# Patient Record
Sex: FEMALE | Race: WHITE | Employment: FULL TIME | URBAN - METROPOLITAN AREA
[De-identification: names, ages, dates, MRNs, and addresses within clinical notes are randomized per-mention and may not be internally consistent; named-entity substitution may affect disease eponyms.]

---

## 2022-03-18 PROBLEM — D32.9 MENINGIOMA (HCC): Status: ACTIVE | Noted: 2019-12-10

## 2022-09-08 ENCOUNTER — TELEPHONE (OUTPATIENT)
Dept: NEUROSURGERY | Age: 52
End: 2022-09-08

## 2022-09-08 DIAGNOSIS — D32.9 MENINGIOMA (HCC): Primary | ICD-10-CM

## 2022-09-08 NOTE — TELEPHONE ENCOUNTER
Shalonda Peoples please place MRI brain w/w out for pts 2 year f/u. Thanks. She would like to have this performed at [facility] due to the cost difference and was instructed to call back to set up her f/u with Dr. Peggy Marcelino after that's scheduled.

## 2022-09-20 NOTE — TELEPHONE ENCOUNTER
Patient states that her MRI was supposed to be sent to [facility], but they don't have it.

## 2022-10-10 DIAGNOSIS — D32.9 MENINGIOMA (HCC): ICD-10-CM

## 2022-10-13 ENCOUNTER — OFFICE VISIT (OUTPATIENT)
Dept: NEUROSURGERY | Age: 52
End: 2022-10-13
Payer: COMMERCIAL

## 2022-10-13 VITALS
TEMPERATURE: 97.3 F | DIASTOLIC BLOOD PRESSURE: 79 MMHG | HEART RATE: 99 BPM | OXYGEN SATURATION: 96 % | SYSTOLIC BLOOD PRESSURE: 132 MMHG | HEIGHT: 63 IN | BODY MASS INDEX: 42.17 KG/M2 | WEIGHT: 238 LBS

## 2022-10-13 DIAGNOSIS — D32.9 MENINGIOMA (HCC): Primary | ICD-10-CM

## 2022-10-13 PROCEDURE — 99214 OFFICE O/P EST MOD 30 MIN: CPT | Performed by: NEUROLOGICAL SURGERY

## 2022-10-13 RX ORDER — TRAMADOL HYDROCHLORIDE 50 MG/1
50 TABLET ORAL EVERY 6 HOURS PRN
COMMUNITY

## 2022-10-13 NOTE — PROGRESS NOTES
History of Present Illness    Patient Words: 45 y.o. This patient is a 45 y.o. 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 This is her 2-year reevaluation. She remains asymptomatic. MRI scan of the brain with contrast shows that the lesion is definitely increased in size but is still only approximate 1 cm in diameter. He has grown from 6mm to 1 cm    . A second lesion is suspected in the right parietal region which could represent a meningioma however my review this area of fails to disclose this. No mass-effect or shift. Past Medical History:   Diagnosis Date    Allergic rhinitis     Sees allergist Dr. Nacho Cordova in [address] [place] [address]     left knee    GERD (gastroesophageal reflux disease)     managed with med PRN    Hypertension     managed with med. Meningioma ([address]     olfactory groove.  f/w Dr. Reema Hackett    Obesity, Class III, BMI 40-49.9 (morbid obesity) ([facility])     BMI 45.3    Personal history of kidney stones     passed on own    Prediabetes     Tinnitus     Unspecified venous (peripheral) insufficiency     Ureterolithiasis         Allergies   Allergen Reactions    Trazodone Nausea Only    Zolpidem Other (See Comments)    Amoxicillin Diarrhea and Nausea And Vomiting        Family History   Problem Relation Age of Onset    Hypertension Father     Diabetes Father         Social History     Socioeconomic History    Marital status:      Spouse name: Not on file    Number of children: Not on file    Years of education: Not on file    Highest education level: Not on file   Occupational History    Not on file   Tobacco Use    Smoking status: Never    Smokeless tobacco: Never   Substance and Sexual Activity    Alcohol use: Yes    Drug use: No    Sexual activity: Not on file   Other Topics Concern    Not on file   Social History Narrative    Not on file     Social Determinants of Health     Financial Resource Strain: Not on file   Food Insecurity: Not on file   Transportation Needs: Not on file   Physical Activity: Not on file   Stress: Not on file   Social Connections: Not on file   Intimate Partner Violence: Not on file   Housing Stability: Not on file       Current Outpatient Medications   Medication Sig Dispense Refill    PREGABALIN PO Take by mouth 150mg TID      traMADol (ULTRAM) 50 MG tablet Take 50 mg by mouth every 6 hours as needed for Pain. Dulaglutide (TRULICITY SC) Inject into the skin      DILTIAZEM HCL PO Diltiazem  mg capsule,extended release 24 hr Take 1 capsule twice a day by oral route. azelastine (ASTELIN) 0.1 % nasal spray 1 spray by Nasal route 2 times daily      cyclobenzaprine (FLEXERIL) 10 MG tablet Cyclobenzaprine 10 mg tablet Take 1 tablet twice a day by oral route. dilTIAZem (TIAZAC) 180 MG extended release capsule Take 2 cap daily      EPINEPHrine (EPIPEN) 0.3 MG/0.3ML SOAJ injection EpiPen 0.3 mg/0.3 mL injection, auto-injector INJECT 0.3 MILLILITER (0.3 MG) BY INTRAMUSCULAR ROUTE ONCE AS NEEDED FOR ANAPHYLAXIS      levocetirizine (XYZAL) 5 MG tablet Xyzal 5 mg tablet Take 1 tablet twice a day by oral route as needed. lisinopril (PRINIVIL;ZESTRIL) 10 MG tablet Take 10 mg by mouth daily      metFORMIN (GLUCOPHAGE) 500 MG tablet TAKE 1 TABLET TWICE A DAY WITH MEALS       No current facility-administered medications for this visit. Patient Active Problem List   Diagnosis    Meningioma [facility])    Personal history of kidney stones    Hypertension    Morbid obesity with BMI of 40.0-44.9, adult (HCC)    Arthritis    GERD (gastroesophageal reflux disease)    Prediabetes        Review of Systems: A complete ROS was done and as stated in the HPI or otherwise negative.     /79   Pulse 99   Temp 97.3 °F (36.3 °C) (Temporal)   Ht 5' 3\" (1.6 m)   Wt 238 lb (108 kg)   SpO2 96%   BMI 42.16 kg/m²        Physical Exam  The physical exam findings are as follows:      Cranial Nerves:   Intact visual fields. Fundi are benign. JULIA, EOM's full, no nystagmus, no ptosis. Facial sensation is normal. Corneal reflexes are intact. Facial movement is symmetric. Hearing is normal bilaterally. Palate is midline with normal sternocleidomastoid and trapezius muscles are normal. Tongue is midline. Motor:  5/5 strength in upper and lower proximal and distal muscles. Normal bulk and tone. No fasciculations. Reflexes:   Deep tendon reflexes 2+/4 and symmetrical.   Sensory:   Normal to touch, pinprick and vibration. Gait:  Normal gait. Tremor:   No tremor noted. Cerebellar:  No cerebellar signs present. Assessment & Plan      ICD-10-CM    1. Meningioma ([facility]  D32.9       Still the lesion is very small. We will recommend a follow-up in 12 months with MRI scanning at that time or sooner if problems arise.       Braxton Hull MD

## 2023-09-21 ENCOUNTER — PATIENT MESSAGE (OUTPATIENT)
Dept: NEUROSURGERY | Age: 53
End: 2023-09-21

## 2023-09-21 DIAGNOSIS — D32.9 MENINGIOMA (HCC): Primary | ICD-10-CM

## 2023-10-09 DIAGNOSIS — D32.9 MENINGIOMA (HCC): ICD-10-CM

## 2023-10-18 ENCOUNTER — OFFICE VISIT (OUTPATIENT)
Dept: NEUROSURGERY | Age: 53
End: 2023-10-18
Payer: COMMERCIAL

## 2023-10-18 VITALS
SYSTOLIC BLOOD PRESSURE: 151 MMHG | OXYGEN SATURATION: 97 % | BODY MASS INDEX: 40.22 KG/M2 | HEIGHT: 63 IN | DIASTOLIC BLOOD PRESSURE: 79 MMHG | TEMPERATURE: 97 F | HEART RATE: 98 BPM | WEIGHT: 227 LBS

## 2023-10-18 DIAGNOSIS — D32.9 MENINGIOMA (HCC): Primary | ICD-10-CM

## 2023-10-18 PROCEDURE — 3077F SYST BP >= 140 MM HG: CPT | Performed by: NEUROLOGICAL SURGERY

## 2023-10-18 PROCEDURE — 3078F DIAST BP <80 MM HG: CPT | Performed by: NEUROLOGICAL SURGERY

## 2023-10-18 PROCEDURE — 99214 OFFICE O/P EST MOD 30 MIN: CPT | Performed by: NEUROLOGICAL SURGERY

## 2023-10-18 RX ORDER — DOXYCYCLINE 100 MG/1
TABLET ORAL
COMMUNITY
Start: 2023-09-07

## 2024-05-28 ENCOUNTER — PATIENT MESSAGE (OUTPATIENT)
Dept: NEUROSURGERY | Age: 54
End: 2024-05-28

## 2024-05-28 DIAGNOSIS — D32.9 MENINGIOMA (HCC): Primary | ICD-10-CM

## 2024-05-29 NOTE — TELEPHONE ENCOUNTER
From: Gwen Arredondo  To: Dr. Stephen Winters  Sent: 5/28/2024 6:26 PM EDT  Subject: Yearly MRI    I am having spinal fusion surgery September 12 - currently one level but more than likely it will change to multiple levels. I know this will have me “down and out” for a while. Would you recommend doing my MRI early? I am concerned about getting on and off the MRI machine. Not sure how that will work when I can’t move normally. Thank you!

## 2024-06-21 ENCOUNTER — OFFICE VISIT (OUTPATIENT)
Age: 54
End: 2024-06-21
Payer: COMMERCIAL

## 2024-06-21 DIAGNOSIS — M54.16 LUMBAR RADICULOPATHY: Primary | ICD-10-CM

## 2024-06-21 DIAGNOSIS — Z51.81 ENCOUNTER FOR THERAPEUTIC DRUG MONITORING: ICD-10-CM

## 2024-06-21 DIAGNOSIS — M96.1 POSTLAMINECTOMY SYNDROME: ICD-10-CM

## 2024-06-21 PROCEDURE — 99204 OFFICE O/P NEW MOD 45 MIN: CPT | Performed by: ANESTHESIOLOGY

## 2024-06-21 RX ORDER — MELOXICAM 15 MG/1
1 TABLET ORAL DAILY
COMMUNITY
Start: 2023-09-01 | End: 2024-06-21 | Stop reason: SDUPTHER

## 2024-06-21 RX ORDER — CYCLOBENZAPRINE HCL 10 MG
10 TABLET ORAL 2 TIMES DAILY PRN
Qty: 60 TABLET | Refills: 0 | Status: SHIPPED | OUTPATIENT
Start: 2024-06-21 | End: 2024-07-21

## 2024-06-21 RX ORDER — TRAMADOL HYDROCHLORIDE 50 MG/1
50 TABLET ORAL 2 TIMES DAILY
Qty: 60 TABLET | Refills: 0 | Status: SHIPPED | OUTPATIENT
Start: 2024-06-21 | End: 2024-07-21

## 2024-06-21 RX ORDER — PREGABALIN 150 MG/1
150 CAPSULE ORAL 3 TIMES DAILY
Qty: 90 CAPSULE | Refills: 1 | Status: SHIPPED | OUTPATIENT
Start: 2024-06-21 | End: 2024-08-20

## 2024-06-21 RX ORDER — MELOXICAM 15 MG/1
15 TABLET ORAL DAILY
Qty: 30 TABLET | Refills: 0 | Status: SHIPPED | OUTPATIENT
Start: 2024-06-21 | End: 2024-07-21

## 2024-06-21 NOTE — PROGRESS NOTES
Previous interventions:  Procedure Date Results   ***                                     Previous medication trials: Listed:  Class Medication Results   NSAIDs ***    Oral steroids     Tylenol     Antiepileptics     Antidepressants     Relaxants Flexeril 10mg    Topicals/transdermaI patches     Narcotics Tramadol 50mg      Previous tests/studies:  Study Date Results   MRI LUMBAR SPINE WO CONTRAST  5.20.2024 Narrative & Impression  History: Spondylolisthesis, lumbosacral region     Exam: MRI lumbar spine without contrast     Technique: Axial and sagittal T1 and T2-weighted sequences are available for  review.  A sagittal STIR sequence is also available for review.     COMPARISON: February 25, 2023     FINDINGS: There is severe levoscoliosis of the lumbar spine. There is grade 1  retrolisthesis at L1-2, 0.3 cm. There is grade 1 spondylolisthesis at L3-4, 0.3  cm. There is grade 1 spondylolisthesis at L5-S1, 0.6 cm. There is spondylolysis  in the posterior elements of L5. The vertebral body height is maintained.   Vertebral body marrow signal is normal.  Intervertebral disc signal is  essentially normal.  The facet articulations are aligned.     The L1-L2 level: There is mild central and right and left paracentral disc  protrusion. There is mild bilateral lateral recess stenosis. There is mild  bilateral foraminal narrowing secondary to disc protrusion and facet  hypertrophy. There is no central canal stenosis.     The L2-L3 level: There is mild central and right and left paracentral disc  protrusion. There is mild right lateral recess stenosis. There is mild bilateral  foraminal narrowing secondary to disc protrusion and facet hypertrophy. There is  no central canal stenosis. A trace right facet effusion is present.     The L3-L4 level: There is disc extrusion which extends beyond the L3 endplate  and into the right lateral recess, attached at the margin, measuring 0.3 x 0.9 x  1.5 cm. There is moderate right and 
History    Not on file   Tobacco Use    Smoking status: Never    Smokeless tobacco: Never   Substance and Sexual Activity    Alcohol use: Yes    Drug use: No    Sexual activity: Not on file   Other Topics Concern    Not on file   Social History Narrative    Not on file     Social Determinants of Health     Financial Resource Strain: Not on file   Food Insecurity: Not on file   Transportation Needs: Not on file   Physical Activity: Not on file   Stress: Not on file   Social Connections: Not on file   Intimate Partner Violence: Not on file   Housing Stability: Not on file            Allergies   Allergen Reactions    Trazodone Nausea Only    Zolpidem Other (See Comments)    Amoxicillin Diarrhea and Nausea And Vomiting           Outpatient Encounter Medications as of 6/21/2024   Medication Sig Dispense Refill    meloxicam (MOBIC) 15 MG tablet Take 1 tablet by mouth daily      doxycycline monohydrate (ADOXA) 100 MG tablet       PREGABALIN PO Take by mouth 150mg TID      traMADol (ULTRAM) 50 MG tablet Take 1 tablet by mouth every 6 hours as needed for Pain.      Dulaglutide (TRULICITY SC) Inject into the skin      DILTIAZEM HCL PO Diltiazem  mg capsule,extended release 24 hr Take 1 capsule twice a day by oral route.      azelastine (ASTELIN) 0.1 % nasal spray 1 spray by Nasal route 2 times daily      cyclobenzaprine (FLEXERIL) 10 MG tablet Cyclobenzaprine 10 mg tablet Take 1 tablet twice a day by oral route.      dilTIAZem (TIAZAC) 180 MG extended release capsule Take 2 cap daily      EPINEPHrine (EPIPEN) 0.3 MG/0.3ML SOAJ injection EpiPen 0.3 mg/0.3 mL injection, auto-injector INJECT 0.3 MILLILITER (0.3 MG) BY INTRAMUSCULAR ROUTE ONCE AS NEEDED FOR ANAPHYLAXIS      levocetirizine (XYZAL) 5 MG tablet Xyzal 5 mg tablet Take 1 tablet twice a day by oral route as needed.      lisinopril (PRINIVIL;ZESTRIL) 10 MG tablet Take 1 tablet by mouth daily      metFORMIN (GLUCOPHAGE) 500 MG tablet TAKE 1 TABLET TWICE A DAY WITH

## 2024-06-28 LAB — DRUGS UR: NORMAL

## 2024-07-26 ENCOUNTER — OFFICE VISIT (OUTPATIENT)
Age: 54
End: 2024-07-26
Payer: COMMERCIAL

## 2024-07-26 DIAGNOSIS — M96.1 POSTLAMINECTOMY SYNDROME: ICD-10-CM

## 2024-07-26 DIAGNOSIS — M54.16 LUMBAR RADICULOPATHY: ICD-10-CM

## 2024-07-26 DIAGNOSIS — M41.26 OTHER IDIOPATHIC SCOLIOSIS, LUMBAR REGION: Primary | ICD-10-CM

## 2024-07-26 PROCEDURE — 99214 OFFICE O/P EST MOD 30 MIN: CPT | Performed by: PHYSICIAN ASSISTANT

## 2024-07-26 RX ORDER — TRAMADOL HYDROCHLORIDE 50 MG/1
50 TABLET ORAL EVERY 8 HOURS PRN
Qty: 90 TABLET | Refills: 2 | Status: SHIPPED | OUTPATIENT
Start: 2024-07-26 | End: 2024-10-24

## 2024-07-26 RX ORDER — PREGABALIN 150 MG/1
150 CAPSULE ORAL 3 TIMES DAILY
Qty: 90 CAPSULE | Refills: 2 | Status: SHIPPED | OUTPATIENT
Start: 2024-07-26 | End: 2024-10-24

## 2024-07-26 RX ORDER — MELOXICAM 15 MG/1
15 TABLET ORAL DAILY
Qty: 30 TABLET | Refills: 2 | Status: SHIPPED | OUTPATIENT
Start: 2024-07-26

## 2024-07-26 RX ORDER — CYCLOBENZAPRINE HCL 10 MG
10 TABLET ORAL 2 TIMES DAILY PRN
Qty: 60 TABLET | Refills: 2 | Status: SHIPPED | OUTPATIENT
Start: 2024-07-26

## 2024-07-26 ASSESSMENT — ENCOUNTER SYMPTOMS
ABDOMINAL PAIN: 0
ALLERGIC/IMMUNOLOGIC NEGATIVE: 1
SHORTNESS OF BREATH: 0
EYES NEGATIVE: 1

## 2024-07-26 NOTE — PROGRESS NOTES
Ms Arredondo is a first time f/u for a new patient visit for chronic low back pain. She reports having seen Dr Piper on July 15th. She is going to see Dr Vernon at [facility] in October. Originally Dr Piper was going to do a surgery on her spine but the new MRI she had showed quite a bit of new stuff. She had the MRI which we reviewed together today. She has seen two orthopedic surgeons for the gluteal tear and both said it was likely coming from her back issues. She uses Tramadol and Pregabalin for pain. It helps some. If she is at a 6, pain may reduce to a 4/10. She uses it to function at work. 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   
view. There is   advanced dextroscoliotic curvature of the thoracolumbar spine. There is multilevel lumbar spondylosis. There is preservation of vertebral body height. There is osteopenia.    XR Hip Left  3/13/24  XR HIP MINIMAL 2 VW LEFTOrder: [id number]  Narrative  Xrays AP Pelvis, Left Hip including AP, 45 and 90 degree Mendieta laterals and  False Profile views: normal joint space femoroacetabular joint. No  significant joint space narrowing. No fracture or dislocations seen.  Normal alignment of femoral neck, head. Normal alpha angle measured at 47  and 50 degrees on Mendieta and Modified Mendieta lateral views without significant  or obvious CAM deformity on femoral neck/head. However, very mild  over-coverage on acetabulum consistent with a mild pincer type deformity  and femoroacetabular impingement. Slightly elevated LCEA of 37 degrees and  ACEA of 35 degrees. Normal Tonnis angle measured on AP Pelvis at 4  degrees.  Enthesopathic changes seen on bilateral greater trochanters with  calcifications present consistent with chronic greater trochanteric pain  syndrome.  Significant spinal deformity is seen in the lumbar spine with  degenerative disc disease.    Impression: Left hip mild pincer dominant femoral acetabular impingement,  enthesopathic changes and calcifications on the greater trochanter  consistent with chronic greater trochanteric pain syndrome in addition to  severe adult spinal deformity and degenerative disc disease of the lumbar  spine.                                      Previous therapies:  Type of Therapy Date Results   Physical therapy ([facility])  2/2024-5/2024, 10 sessions Helped pain                                                     Opioid Monitoring:  Last UDS (results): N/A (N/A) Opioid agreement signed: N/A  [facility] database: evaluated today, appropriate Compliance issues: N/A  Last opioid dose change: N/A    Opioid Risk Tool Score (low/mod/high}:    Opioid Risk Tool Female I Male

## 2024-07-31 ENCOUNTER — PATIENT MESSAGE (OUTPATIENT)
Dept: NEUROSURGERY | Age: 54
End: 2024-07-31

## 2024-07-31 NOTE — TELEPHONE ENCOUNTER
From: Gwen Arredondo  To: Dr. Stephen Winters  Sent: 7/31/2024 9:44 AM EDT  Subject: MRI    Good morning! I just wanted to let you know that my back surgery has been cancelled for now. I recent MRI showed additional issues that my doctor would like an opinion from a doctor with spinal deformity experience. Can I just set the appointment up for late September?

## 2024-09-19 ENCOUNTER — OFFICE VISIT (OUTPATIENT)
Dept: NEUROSURGERY | Age: 54
End: 2024-09-19
Payer: COMMERCIAL

## 2024-09-19 VITALS
BODY MASS INDEX: 38.8 KG/M2 | HEART RATE: 96 BPM | SYSTOLIC BLOOD PRESSURE: 141 MMHG | OXYGEN SATURATION: 92 % | DIASTOLIC BLOOD PRESSURE: 66 MMHG | HEIGHT: 63 IN | WEIGHT: 219 LBS

## 2024-09-19 DIAGNOSIS — D32.9 MENINGIOMA (HCC): Primary | ICD-10-CM

## 2024-09-19 PROCEDURE — 99212 OFFICE O/P EST SF 10 MIN: CPT | Performed by: NURSE PRACTITIONER

## 2024-09-19 PROCEDURE — 3078F DIAST BP <80 MM HG: CPT | Performed by: NURSE PRACTITIONER

## 2024-09-19 PROCEDURE — 3077F SYST BP >= 140 MM HG: CPT | Performed by: NURSE PRACTITIONER

## 2024-10-24 ENCOUNTER — OFFICE VISIT (OUTPATIENT)
Age: 54
End: 2024-10-24
Payer: COMMERCIAL

## 2024-10-24 DIAGNOSIS — M47.817 LUMBOSACRAL SPONDYLOSIS WITHOUT MYELOPATHY: ICD-10-CM

## 2024-10-24 DIAGNOSIS — M96.1 POSTLAMINECTOMY SYNDROME: ICD-10-CM

## 2024-10-24 DIAGNOSIS — M54.16 LUMBAR RADICULOPATHY: Primary | ICD-10-CM

## 2024-10-24 PROCEDURE — 99214 OFFICE O/P EST MOD 30 MIN: CPT | Performed by: ANESTHESIOLOGY

## 2024-10-24 RX ORDER — MELOXICAM 15 MG/1
15 TABLET ORAL DAILY
Qty: 30 TABLET | Refills: 1 | Status: SHIPPED | OUTPATIENT
Start: 2024-10-24

## 2024-10-24 RX ORDER — PREGABALIN 150 MG/1
150 CAPSULE ORAL 3 TIMES DAILY
Qty: 90 CAPSULE | Refills: 1 | Status: SHIPPED | OUTPATIENT
Start: 2024-10-24 | End: 2025-01-22

## 2024-10-24 RX ORDER — TRAMADOL HYDROCHLORIDE 50 MG/1
50 TABLET ORAL EVERY 8 HOURS PRN
Qty: 90 TABLET | Refills: 1 | Status: SHIPPED | OUTPATIENT
Start: 2024-10-24 | End: 2025-01-22

## 2024-10-24 RX ORDER — CYCLOBENZAPRINE HCL 10 MG
10 TABLET ORAL 2 TIMES DAILY PRN
Qty: 60 TABLET | Refills: 1 | Status: SHIPPED | OUTPATIENT
Start: 2024-10-24

## 2024-10-24 ASSESSMENT — ENCOUNTER SYMPTOMS
ALLERGIC/IMMUNOLOGIC NEGATIVE: 1
SHORTNESS OF BREATH: 0
EYES NEGATIVE: 1
ABDOMINAL PAIN: 0

## 2024-10-24 NOTE — PROGRESS NOTES
Endocrine: Negative.    Genitourinary: Negative.    Skin:  Negative for rash.   Allergic/Immunologic: Negative.    Neurological:  Negative for dizziness.   Hematological: Negative.    Psychiatric/Behavioral: Negative.           All 11 systems reviewed and were negative.          The [place] database for controlled substance prescription monitoring was reviewed.    Date: October 24, 2024  Patient Name: wGen Arredondo  MRN:[id number]  PCP: Ioana Contreras personally performed the HPI, exam, and assessment/plan, verified the documentation and approve it is updated, accurate, and complete. Parts or the entirety of this document were transcribed utilizing voice recognition software. Transcription errors may be present.    Billie Cheung PA-C

## 2024-11-06 ENCOUNTER — PATIENT MESSAGE (OUTPATIENT)
Age: 54
End: 2024-11-06

## 2024-11-06 DIAGNOSIS — M54.16 LUMBAR RADICULOPATHY: ICD-10-CM

## 2024-11-06 DIAGNOSIS — M96.1 POSTLAMINECTOMY SYNDROME: ICD-10-CM

## 2024-11-08 RX ORDER — TRAMADOL HYDROCHLORIDE 50 MG/1
50 TABLET ORAL EVERY 8 HOURS PRN
Qty: 90 TABLET | Refills: 0 | Status: SHIPPED | OUTPATIENT
Start: 2024-11-08 | End: 2025-02-06

## 2024-11-08 RX ORDER — MELOXICAM 15 MG/1
15 TABLET ORAL DAILY
Qty: 30 TABLET | Refills: 0 | Status: SHIPPED | OUTPATIENT
Start: 2024-11-08

## 2024-11-08 RX ORDER — PREGABALIN 150 MG/1
150 CAPSULE ORAL 3 TIMES DAILY
Qty: 90 CAPSULE | Refills: 0 | Status: SHIPPED | OUTPATIENT
Start: 2024-11-08 | End: 2025-02-06

## 2024-11-08 RX ORDER — CYCLOBENZAPRINE HCL 10 MG
10 TABLET ORAL 2 TIMES DAILY PRN
Qty: 60 TABLET | Refills: 0 | Status: SHIPPED | OUTPATIENT
Start: 2024-11-08

## 2024-11-08 NOTE — TELEPHONE ENCOUNTER
Long story short, she would like to follow up in January so we will need to send 1 additional refill for each medication to the pharmacy.  I have them pended here

## 2024-12-16 ENCOUNTER — PATIENT MESSAGE (OUTPATIENT)
Age: 54
End: 2024-12-16

## 2024-12-20 ENCOUNTER — OUTSIDE SERVICES (OUTPATIENT)
Age: 54
End: 2024-12-20

## 2024-12-20 DIAGNOSIS — M47.817 LUMBOSACRAL SPONDYLOSIS WITHOUT MYELOPATHY: Primary | ICD-10-CM

## 2024-12-20 NOTE — PROGRESS NOTES
[facility] OPERATIVE NOTE  Name: Gwen Arredondo   MRN:[id number]   :1970    Location: POA    Procedure: Bilateral Lumbar Medial Branch Block Under Fluoroscopic Imaging, L3-L4, L4-L5, L5-SA Facets     Pre-op Diagnosis: 1. Lumbar Spondylosis without Myelopathy. 2. Lumbar Facet Arthropathy. 3. Low Back Pain     Post-op Diagnosis: Same    Anesthesia: Local only       Complications: None    PROCEDURE: Fluroscopically Guided Lumbar Medial Branch Blocks    After confirming written and informed consent and discussing the risk, benefits and alternatives for the procedure. The patient had the correct site marked by the physician performing the procedure. The specific risks of bleeding and infection were discussed. The patient was taken to the fluoroscopy suite. The patient was then placed in the prone position. A pulse oximeter was placed, and verbal and visual monitoring were maintained throughout the procedure. The skin overlying the lumbo-sacral spine was then prepped with chlorhexidine gluconate and a sterile drape was placed. A hat and mask were worn, and the hands were cleaned with an alcohol-containing solution. Sterile gloves were then worn. A time out was then performed involving the physician, radiation technologist and the patient.    Next, the anatomy of the lumbar spine was then identified using fluoroscopic guidance. The area of usual pain was identified using patient assistance, and was confirmed with fluoroscopy. A 22 G, 5 inch Quincke needle was used for the procedure.    An oblique view of the right L4 transverse process was then utilized to identify the junction of the junction of the transverse process and superior articulating process. The skin overlying this area was anesthetized with 0.2 ml of 1% lidocaine using a 25 G 1.5 inch needle. Next, using intermittent fluoroscopic guidance, the spinal needle was advanced toward the junction of the transverse process and superior

## 2025-01-10 ENCOUNTER — OFFICE VISIT (OUTPATIENT)
Age: 55
End: 2025-01-10
Payer: COMMERCIAL

## 2025-01-10 DIAGNOSIS — M96.1 POSTLAMINECTOMY SYNDROME: ICD-10-CM

## 2025-01-10 DIAGNOSIS — M54.16 LUMBAR RADICULOPATHY: ICD-10-CM

## 2025-01-10 PROCEDURE — 99215 OFFICE O/P EST HI 40 MIN: CPT | Performed by: PHYSICIAN ASSISTANT

## 2025-01-10 RX ORDER — PREGABALIN 150 MG/1
150 CAPSULE ORAL 3 TIMES DAILY
Qty: 90 CAPSULE | Refills: 2 | Status: SHIPPED | OUTPATIENT
Start: 2025-02-05 | End: 2025-05-06

## 2025-01-10 RX ORDER — TRAMADOL HYDROCHLORIDE 50 MG/1
50 TABLET ORAL EVERY 8 HOURS PRN
Qty: 90 TABLET | Refills: 2 | Status: SHIPPED | OUTPATIENT
Start: 2025-02-05 | End: 2025-05-06

## 2025-01-10 RX ORDER — MELOXICAM 15 MG/1
15 TABLET ORAL DAILY
Qty: 90 TABLET | Refills: 3 | Status: SHIPPED | OUTPATIENT
Start: 2025-01-10 | End: 2026-01-05

## 2025-01-10 ASSESSMENT — ENCOUNTER SYMPTOMS
SHORTNESS OF BREATH: 0
ABDOMINAL PAIN: 0
ALLERGIC/IMMUNOLOGIC NEGATIVE: 1
EYES NEGATIVE: 1

## 2025-01-10 NOTE — PROGRESS NOTES
Socioeconomic History    Marital status:      Spouse name: Not on file    Number of children: Not on file    Years of education: Not on file    Highest education level: Not on file   Occupational History    Not on file   Tobacco Use    Smoking status: Never    Smokeless tobacco: Never   Substance and Sexual Activity    Alcohol use: Yes    Drug use: No    Sexual activity: Not on file   Other Topics Concern    Not on file   Social History Narrative    Not on file     Social Determinants of Health     Financial Resource Strain: Not on file   Food Insecurity: Not on file   Transportation Needs: Not on file   Physical Activity: Not on file   Stress: Not on file   Social Connections: Unknown (3/19/2021)    Received from [facility]    Social Connections     Frequency of Communication with Friends and Family: Not asked     Frequency of Social Gatherings with Friends and Family: Not asked   Intimate Partner Violence: Unknown (3/19/2021)    Received from [facility]    Intimate Partner Violence     Fear of Current or Ex-Partner: Not asked     Emotionally Abused: Not asked     Physically Abused: Not asked     Sexually Abused: Not asked   Housing Stability: Not At Risk (3/9/2022)    Received from [facility]    Housing Stability     Was there a time when you did not have a steady place to sleep: Not asked     Worried that the place you are staying is making you sick: Not asked            Allergies   Allergen Reactions    Trazodone Nausea Only    Zolpidem Other (See Comments)    Amoxicillin Diarrhea and Nausea And Vomiting           Outpatient Encounter Medications as of 1/10/2025   Medication Sig Dispense Refill    cyclobenzaprine (FLEXERIL) 10 MG tablet Take 1 tablet by mouth 2 times daily as needed for Muscle spasms 60 tablet 0    pregabalin (LYRICA) 150 MG capsule Take 1 capsule by mouth in the morning, at noon, and at bedtime for 90 days. Max Daily Amount: 450

## 2025-04-28 RX ORDER — CYCLOBENZAPRINE HCL 10 MG
10 TABLET ORAL 2 TIMES DAILY PRN
Qty: 60 TABLET | Refills: 0 | Status: CANCELLED | OUTPATIENT
Start: 2025-04-28

## 2025-04-29 ENCOUNTER — OFFICE VISIT (OUTPATIENT)
Age: 55
End: 2025-04-29
Payer: COMMERCIAL

## 2025-04-29 DIAGNOSIS — M96.1 POSTLAMINECTOMY SYNDROME: ICD-10-CM

## 2025-04-29 DIAGNOSIS — Z51.81 ENCOUNTER FOR THERAPEUTIC DRUG MONITORING: ICD-10-CM

## 2025-04-29 DIAGNOSIS — M54.16 LUMBAR RADICULOPATHY: Primary | ICD-10-CM

## 2025-04-29 DIAGNOSIS — M54.16 LUMBAR RADICULOPATHY: ICD-10-CM

## 2025-04-29 PROCEDURE — 99214 OFFICE O/P EST MOD 30 MIN: CPT | Performed by: PHYSICIAN ASSISTANT

## 2025-04-29 RX ORDER — PREGABALIN 150 MG/1
150 CAPSULE ORAL 3 TIMES DAILY
Qty: 90 CAPSULE | Refills: 2 | Status: SHIPPED | OUTPATIENT
Start: 2025-05-09 | End: 2025-08-07

## 2025-04-29 RX ORDER — TRAMADOL HYDROCHLORIDE 50 MG/1
50 TABLET ORAL EVERY 8 HOURS PRN
Qty: 90 TABLET | Refills: 2 | Status: SHIPPED | OUTPATIENT
Start: 2025-05-09 | End: 2025-08-07

## 2025-04-29 ASSESSMENT — ENCOUNTER SYMPTOMS
ABDOMINAL PAIN: 0
EYES NEGATIVE: 1
ALLERGIC/IMMUNOLOGIC NEGATIVE: 1
SHORTNESS OF BREATH: 0

## 2025-04-29 NOTE — PROGRESS NOTES
Mrs. Arredondo is a 3-month follow-up for chronic low back pain with history of prior spine surgery.  She is currently maintained on tramadol 3 times daily, Lyrica 150 mg 3 times daily, Flexeril as needed as well as meloxicam as needed.  She did undergo first medial branch block which improved her back pain but unfortunately her second medial branch block was not completed secondary to timing of appointments/deductible.  
needed.      lisinopril (PRINIVIL;ZESTRIL) 10 MG tablet Take 1 tablet by mouth daily      metFORMIN (GLUCOPHAGE) 500 MG tablet TAKE 1 TABLET TWICE A DAY WITH MEALS       No facility-administered encounter medications on file as of 4/29/2025.          Review of Systems   Constitutional:  Negative for chills, fatigue, fever and unexpected weight change.   HENT:  Negative for congestion and ear pain.    Eyes: Negative.    Respiratory:  Negative for shortness of breath.    Cardiovascular:  Negative for chest pain.   Gastrointestinal:  Negative for abdominal pain.   Endocrine: Negative.    Genitourinary: Negative.    Skin:  Negative for rash.   Allergic/Immunologic: Negative.    Neurological:  Negative for dizziness.   Hematological: Negative.    Psychiatric/Behavioral: Negative.           All 11 systems reviewed and were negative.          The [place] database for controlled substance prescription monitoring was reviewed.    Date: April 29, 2025  Patient Name: Gwen Arredondo  MRN:[id number]  PCP: Ioana Contreras personally performed the HPI, exam, and assessment/plan, verified the documentation and approve it is updated, accurate, and complete. Parts or the entirety of this document were transcribed utilizing voice recognition software. Transcription errors may be present.    Billie Cheung PA-C

## 2025-05-03 LAB — DRUGS UR: NORMAL

## 2025-08-01 ENCOUNTER — OFFICE VISIT (OUTPATIENT)
Age: 55
End: 2025-08-01
Payer: COMMERCIAL

## 2025-08-01 DIAGNOSIS — M54.16 LUMBAR RADICULOPATHY: ICD-10-CM

## 2025-08-01 DIAGNOSIS — M96.1 POSTLAMINECTOMY SYNDROME: ICD-10-CM

## 2025-08-01 PROCEDURE — 99214 OFFICE O/P EST MOD 30 MIN: CPT | Performed by: PHYSICIAN ASSISTANT

## 2025-08-01 RX ORDER — TRAMADOL HYDROCHLORIDE 50 MG/1
50 TABLET ORAL EVERY 8 HOURS PRN
Qty: 90 TABLET | Refills: 2 | Status: SHIPPED | OUTPATIENT
Start: 2025-08-09 | End: 2025-11-07

## 2025-08-01 RX ORDER — PREGABALIN 150 MG/1
150 CAPSULE ORAL 3 TIMES DAILY
Qty: 90 CAPSULE | Refills: 2 | Status: SHIPPED | OUTPATIENT
Start: 2025-08-09 | End: 2025-11-07

## 2025-08-01 ASSESSMENT — ENCOUNTER SYMPTOMS
EYES NEGATIVE: 1
ABDOMINAL PAIN: 0
SHORTNESS OF BREATH: 0
ALLERGIC/IMMUNOLOGIC NEGATIVE: 1

## 2025-08-01 NOTE — PROGRESS NOTES
Ms. Arredondo is a 3-month follow-up for chronic low back pain with history of prior back surgery.  She has seen 4 different neurosurgeons with varying opinions on how to proceed to manage her back issues.  She is currently prescribed tramadol 3 times daily, Lyrica 150 mg 3 times daily, meloxicam as needed and Flexeril as needed.  She has a high deductible health care plan and cannot afford to continue with procedural intervention at this time.  Urine screen was collected at her April visit which was positive for appropriate medications.  
injections have never really helped that much.     6/21/24: 53-year-old female presents for evaluation treatment of low back pain.  Patient reports she has had pain since 2019 and describes no inciting event.  Patient's pain is low back predominantly left-sided does extend down the left lower extremity along the posterior as well as the anterior aspect over the knee and extends the level of the foot to the great toe.  This pain is present with standing ambulating and sitting.  She has greatest pain with standing and ambulating.  She denies any incontinence or numbness in her groin.  She is obtained repeat imaging via lumbar MRI which displays multilevel listhesis, degenerative disc disease, and stenosis.  She is undergone physical therapy at [facility] rehab February through March 2024 that provided her some benefit.  She has received 2 injections in the lumbar spine which provided minimal benefit.  She has undergone an L4-5 laminectomy and 2023.  She was supposedly scheduled for further surgery with Dr. Piper but has been placed on hold at this time.  She takes over-the-counter medication such as Tylenol ibuprofen as well as other prescription strength NSAIDs antiepileptics muscle relaxants creams gels patches and low-dose narcotics.  She reports this is medication regimen allows her to function operate to a level that she would otherwise being capable of achieving without its use.  She places this percentage improvement around 30 to 40%.  Denies side effects.  We discussed the continuation of this regimen as well as awaiting further surgical decision making from Dr. Piper's office.  Time was spent reviewing patient's chart, notes, imaging.       Location of greatest pain? Lumber Spine and Left LE hip  Other areas of Pain? Left LE into calf and foot  Onset:2019   Inciting event: none    Course: gradually, worsening  Pain Description:Dull or Aching, Hot or Burning, Tingling, Spasming, and Stabbing or Sharp  Worst during: